# Patient Record
Sex: MALE | Race: BLACK OR AFRICAN AMERICAN | NOT HISPANIC OR LATINO | Employment: FULL TIME | URBAN - METROPOLITAN AREA
[De-identification: names, ages, dates, MRNs, and addresses within clinical notes are randomized per-mention and may not be internally consistent; named-entity substitution may affect disease eponyms.]

---

## 2024-10-10 ENCOUNTER — ANCILLARY PROCEDURE (OUTPATIENT)
Dept: URGENT CARE | Age: 24
End: 2024-10-10
Payer: COMMERCIAL

## 2024-10-10 ENCOUNTER — OFFICE VISIT (OUTPATIENT)
Dept: URGENT CARE | Age: 24
End: 2024-10-10
Payer: COMMERCIAL

## 2024-10-10 DIAGNOSIS — S61.512A LACERATION OF LEFT WRIST, INITIAL ENCOUNTER: Primary | ICD-10-CM

## 2024-10-10 DIAGNOSIS — M79.642 LEFT HAND PAIN: ICD-10-CM

## 2024-10-10 DIAGNOSIS — M79.645 PAIN IN FINGER OF LEFT HAND: ICD-10-CM
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
## 2024-10-10 ASSESSMENT — ENCOUNTER SYMPTOMS
JOINT SWELLING: 0
PSYCHIATRIC NEGATIVE: 1
MYALGIAS: 1
ARTHRALGIAS: 0
NEUROLOGICAL NEGATIVE: 1
RESPIRATORY NEGATIVE: 1
CONSTITUTIONAL NEGATIVE: 1
CARDIOVASCULAR NEGATIVE: 1
WOUND: 1

## 2024-10-11 NOTE — PROGRESS NOTES
Subjective   Patient ID: Mahamed Dallas is a 24 y.o. male. They present today with a chief complaint of No chief complaint on file..    History of Present Illness  23 yo male presents with c/o left wrist laceration that was sustained at work, he was moving a piece of sheet metal when it came back and hit left hand/wrist causing laceration and pain.  Occurred today around 5:30p, laceration continues to bleed.  Has been holding pressure with gauze.  Brought to [facility] by manager.          Past Medical History  Allergies as of 10/10/2024    (Not on File)       (Not in a hospital admission)       No past medical history on file.    No past surgical history on file.         Review of Systems  Review of Systems   Constitutional: Negative.    Respiratory: Negative.     Cardiovascular: Negative.    Musculoskeletal:  Positive for myalgias. Negative for arthralgias and joint swelling.   Skin:  Positive for wound.   Neurological: Negative.    Psychiatric/Behavioral: Negative.                                    Objective    There were no vitals filed for this visit.  No LMP for male patient.    Physical Exam  Constitutional:       Appearance: Normal appearance.   Cardiovascular:      Rate and Rhythm: Normal rate and regular rhythm.   Pulmonary:      Effort: Pulmonary effort is normal.      Breath sounds: Normal breath sounds.   Musculoskeletal:      Left hand: Tenderness (1st metacarpal area) and bony tenderness present. No swelling, deformity or lacerations. Normal range of motion. Normal strength. Normal sensation. There is no disruption of two-point discrimination. Normal capillary refill. Normal pulse.   Skin:     General: Skin is warm and dry.      Comments: Left wrist 2cm laceration, radial styloid area   Neurological:      General: No focal deficit present.      Mental Status: He is alert and oriented to person, place, and time.   Psychiatric:         Mood and Affect: Mood normal.         Behavior: Behavior normal.          Thought Content: Thought content normal.         Laceration Repair    Date/Time: 10/10/2024 9:01 PM    Performed by: PUJA Infante  Authorized by: PUJA Infante    Consent:     Consent obtained:  Verbal and written    Consent given by:  Patient    Risks, benefits, and alternatives were discussed: yes      Risks discussed:  Infection, retained foreign body, need for additional repair, pain, poor cosmetic result, tendon damage, nerve damage, poor wound healing and vascular damage    Alternatives discussed:  No treatment  Universal protocol:     Procedure explained and questions answered to patient or proxy's satisfaction: yes      Relevant documents present and verified: yes      Test results available: yes      Imaging studies available: yes      Patient identity confirmed:  Verbally with patient  Anesthesia:     Anesthesia method:  Local infiltration    Local anesthetic:  Lidocaine 1% w/o epi  Laceration details:     Location: left wrist.    Length (cm):  2  Pre-procedure details:     Preparation:  Patient was prepped and draped in usual sterile fashion and imaging obtained to evaluate for foreign bodies  Exploration:     Limited defect created (wound extended): no      Hemostasis achieved with:  Direct pressure    Imaging outcome: foreign body not noted      Wound exploration: wound explored through full range of motion      Wound extent: no foreign bodies/material noted, no muscle damage noted, no nerve damage noted, no tendon damage noted, no underlying fracture noted and no vascular damage noted      Contaminated: no    Treatment:     Area cleansed with:  Saline    Amount of cleaning:  Standard    Irrigation solution:  Sterile saline    Irrigation volume:  20 ml    Irrigation method:  Pressure wash    Debridement:  None  Skin repair:     Repair method:  Sutures    Suture size:  5-0    Suture material:  Nylon    Number of sutures:  5  Approximation:     Approximation:  Close  Repair type:      Repair type:  Simple  Post-procedure details:     Dressing:  Non-adherent dressing and tube gauze      Point of Care Test & Imaging Results from this visit  No results found for this visit on 10/10/24.   XR hand left 3+ views    Result Date: 10/10/2024  Interpreted By:  Rustam Mike, STUDY: XR HAND LEFT 3+ VIEWS   INDICATION: Signs/Symptoms:injury left hand.   COMPARISON: January 9, 2013   ACCESSION NUMBER(S): [id number]   ORDERING CLINICIAN: YOSEF SLATER   FINDINGS: No osseous, articular, or soft tissue abnormality. Congenital lunotriquetral carpal coalition unchanged.       No acute findings left hand.   Signed by: Rustam Mike 10/10/2024 7:19 PM Dictation workstation:   [id number]     Diagnostic study results (if any) were reviewed by PUJA Infante.    Assessment/Plan   Allergies, medications, history, and pertinent labs/EKGs/Imaging reviewed by PUJA Infante.     Medical Decision Making  Sutures can be removed in 7-10 days. Keep wound clean with soap and water and apply antibiotic ointment and bandage daily.   Avoid soaking in tub bath, swimming pool, jacuzzi  Follow up with any signs of infection: Redness, drainage, swelling, pain, fever, chills  [employer] filled out-see scanned documents                                   Orders and Diagnoses  Diagnoses and all orders for this visit:  Laceration of left wrist, initial encounter  -     Td vaccine, age 7 years and older (TENIVAC)  Left hand pain      Medical Admin Record      Patient disposition: Home    Electronically signed by PUJA Infante  8:50 PM